# Patient Record
Sex: MALE | Race: WHITE | NOT HISPANIC OR LATINO | URBAN - METROPOLITAN AREA
[De-identification: names, ages, dates, MRNs, and addresses within clinical notes are randomized per-mention and may not be internally consistent; named-entity substitution may affect disease eponyms.]

---

## 2020-05-30 ENCOUNTER — HOSPITAL ENCOUNTER (EMERGENCY)
Facility: HOSPITAL | Age: 50
Discharge: HOME | End: 2020-05-31
Attending: INTERNAL MEDICINE
Payer: COMMERCIAL

## 2020-05-30 VITALS
OXYGEN SATURATION: 97 % | SYSTOLIC BLOOD PRESSURE: 152 MMHG | HEIGHT: 71 IN | DIASTOLIC BLOOD PRESSURE: 91 MMHG | HEART RATE: 87 BPM | TEMPERATURE: 99 F | BODY MASS INDEX: 28.7 KG/M2 | RESPIRATION RATE: 18 BRPM | WEIGHT: 205 LBS

## 2020-05-30 DIAGNOSIS — S61.412A LACERATION OF LEFT HAND WITHOUT FOREIGN BODY, INITIAL ENCOUNTER: Primary | ICD-10-CM

## 2020-05-30 DIAGNOSIS — T14.90XA TRAUMA: ICD-10-CM

## 2020-05-30 PROCEDURE — 73130 XR HAND COMPLETE 3 VIEW LEFT: ICD-10-PCS | Mod: 26,LT,, | Performed by: RADIOLOGY

## 2020-05-30 PROCEDURE — 73130 X-RAY EXAM OF HAND: CPT | Mod: TC,FY,LT

## 2020-05-30 PROCEDURE — 73130 X-RAY EXAM OF HAND: CPT | Mod: 26,LT,, | Performed by: RADIOLOGY

## 2020-05-30 PROCEDURE — 12042 INTMD RPR N-HF/GENIT2.6-7.5: CPT | Mod: ,,, | Performed by: INTERNAL MEDICINE

## 2020-05-30 PROCEDURE — 12042 INTMD RPR N-HF/GENIT2.6-7.5: CPT | Mod: FA

## 2020-05-30 PROCEDURE — 12042: ICD-10-PCS | Mod: ,,, | Performed by: INTERNAL MEDICINE

## 2020-05-30 PROCEDURE — 90471 IMMUNIZATION ADMIN: CPT | Performed by: INTERNAL MEDICINE

## 2020-05-30 PROCEDURE — 96372 THER/PROPH/DIAG INJ SC/IM: CPT | Mod: 59

## 2020-05-30 PROCEDURE — 99284 EMERGENCY DEPT VISIT MOD MDM: CPT | Mod: 25

## 2020-05-30 PROCEDURE — 63600175 PHARM REV CODE 636 W HCPCS: Performed by: INTERNAL MEDICINE

## 2020-05-30 PROCEDURE — 90714 TD VACC NO PRESV 7 YRS+ IM: CPT | Performed by: INTERNAL MEDICINE

## 2020-05-30 RX ORDER — LIDOCAINE HYDROCHLORIDE 10 MG/ML
INJECTION INFILTRATION; PERINEURAL
Status: DISCONTINUED
Start: 2020-05-30 | End: 2020-05-31 | Stop reason: HOSPADM

## 2020-05-30 RX ORDER — CEFTRIAXONE 1 G/1
1 INJECTION, POWDER, FOR SOLUTION INTRAMUSCULAR; INTRAVENOUS
Status: COMPLETED | OUTPATIENT
Start: 2020-05-30 | End: 2020-05-30

## 2020-05-30 RX ADMIN — TETANUS AND DIPHTHERIA TOXOIDS ADSORBED 0.5 ML: 2; 2 INJECTION INTRAMUSCULAR at 11:05

## 2020-05-30 RX ADMIN — CEFTRIAXONE SODIUM 1 G: 1 INJECTION, POWDER, FOR SOLUTION INTRAMUSCULAR; INTRAVENOUS at 11:05

## 2020-05-31 RX ORDER — ACETAMINOPHEN AND CODEINE PHOSPHATE 300; 30 MG/1; MG/1
1 TABLET ORAL EVERY 6 HOURS PRN
Qty: 12 TABLET | Refills: 0 | Status: SHIPPED | OUTPATIENT
Start: 2020-05-31 | End: 2020-06-10

## 2020-05-31 RX ORDER — AMOXICILLIN AND CLAVULANATE POTASSIUM 875; 125 MG/1; MG/1
1 TABLET, FILM COATED ORAL 2 TIMES DAILY
Qty: 20 TABLET | Refills: 0 | Status: SHIPPED | OUTPATIENT
Start: 2020-05-31

## 2020-05-31 NOTE — ED PROVIDER NOTES
Encounter Date: 5/30/2020       History     Chief Complaint   Patient presents with    Laceration     left thumb was struck by a hi speed grinding wheel  bleeding controlled     Patient is a 49-year-old male that presented with complaints laceration to the left thumb/hand area.  Patient was using a  and the grinding wheel broke apart sending shrapnel from the grounding will into his left hand.  The laceration is on the palmar surface in the thenar area at the MP joint of the thumb.  Laceration approximately 3 cm with jagged edges.  Patient is the usually irrigating now and will get x-ray to ensure no foreign body.  Patient denies any other past medical history.  Patient has no known drug allergies.        Review of patient's allergies indicates:  No Known Allergies  History reviewed. No pertinent past medical history.  History reviewed. No pertinent surgical history.  History reviewed. No pertinent family history.  Social History     Tobacco Use    Smoking status: Former Smoker   Substance Use Topics    Alcohol use: Not Currently    Drug use: Not Currently     Review of Systems   Constitutional: Negative for activity change, appetite change, fatigue and fever.   HENT: Negative for congestion, ear discharge, mouth sores, nosebleeds, rhinorrhea, sinus pressure, sinus pain and tinnitus.    Eyes: Negative.  Negative for pain, redness and itching.   Respiratory: Negative for apnea, cough, choking, chest tightness, shortness of breath, wheezing and stridor.    Cardiovascular: Negative for chest pain, palpitations and leg swelling.   Gastrointestinal: Negative for abdominal distention, abdominal pain, anal bleeding, blood in stool, constipation and diarrhea.   Endocrine: Negative.    Genitourinary: Negative for difficulty urinating, flank pain, frequency and urgency.   Musculoskeletal: Negative for arthralgias, back pain, gait problem and myalgias.   Skin: Positive for wound. Negative for color change and  pallor.        Patient has 3 cm wound of the left palmar surface at the MP joint of the 1st digit.  Sees jagged in nature and bleeding is well controlled.   Allergic/Immunologic: Negative.    Neurological: Negative for dizziness, facial asymmetry, weakness, light-headedness and headaches.   Hematological: Negative for adenopathy. Does not bruise/bleed easily.   Psychiatric/Behavioral: The patient is nervous/anxious.        Physical Exam     Initial Vitals [05/30/20 2244]   BP Pulse Resp Temp SpO2   (!) 152/91 87 18 98.5 °F (36.9 °C) 97 %      MAP       --         Physical Exam    Nursing note and vitals reviewed.  Constitutional: He appears well-developed and well-nourished.   HENT:   Head: Normocephalic and atraumatic.   Eyes: EOM are normal. Pupils are equal, round, and reactive to light.   Neck: Normal range of motion.   Cardiovascular: Normal rate and regular rhythm.   Pulmonary/Chest: Breath sounds normal.   Abdominal: Soft. Bowel sounds are normal.   Musculoskeletal: Normal range of motion.   Neurological: He is alert and oriented to person, place, and time. He has normal strength. GCS score is 15. GCS eye subscore is 4. GCS verbal subscore is 5. GCS motor subscore is 6.   Skin: Capillary refill takes less than 2 seconds.   Patient with 3 cm linear jagged wound on the left palmar surface at the MP joint 1st digit.  Bleeding is well controlled and vigorous irrigation has been completed.  Patient has opposition of all digits and no tendon involvement.   Psychiatric: He has a normal mood and affect.         ED Course   Lac Repair  Date/Time: 5/30/2020 11:57 PM  Performed by: Vinny Flores MD  Authorized by: Vinny Flores MD   Consent Done: Yes  Consent: Verbal consent obtained.  Risks and benefits: risks, benefits and alternatives were discussed  Consent given by: patient  Patient understanding: patient states understanding of the procedure being performed  Patient consent: the patient's understanding of  "the procedure matches consent given  Procedure consent: procedure consent matches procedure scheduled  Relevant documents: relevant documents present and verified  Test results: test results available and properly labeled  Site marked: the operative site was marked  Patient identity confirmed: , MRN, name, provided demographic data and verbally with patient  Time out: Immediately prior to procedure a "time out" was called to verify the correct patient, procedure, equipment, support staff and site/side marked as required.  Body area: upper extremity  Location details: left thumb  Laceration length: 3 cm  Contamination: The wound is contaminated.  Foreign bodies: metal  Tendon involvement: none  Nerve involvement: none  Vascular damage: no  Anesthesia: local infiltration    Anesthesia:  Local Anesthetic: lidocaine 1% without epinephrine  Preparation: Patient was prepped and draped in the usual sterile fashion.  Irrigation solution: saline  Irrigation method: syringe  Amount of cleaning: extensive  Debridement: none  Degree of undermining: none  Skin closure: 4-0 nylon  Number of sutures: 6  Technique: simple  Approximation: close  Approximation difficulty: simple  Dressing: dressing applied  Patient tolerance: Patient tolerated the procedure well with no immediate complications        Labs Reviewed - No data to display       Imaging Results          X-Ray Hand 3 view Left (In process)    Procedure changed from X-Ray Hand 2 View Left               X-Rays:   Independently Interpreted Readings:   Other Readings:  X-ray of the left hand reveals very small avulsion fracture of the proximal phalanx of the 1st digit left hand.  No dislocation the bone and otherwise no foreign bodies noted                                    Clinical Impression:       ICD-10-CM ICD-9-CM   1. Laceration of left hand without foreign body, initial encounter  S61.412A 882.0   2. Trauma  T14.90XA 959.9                             "

## 2024-01-03 ENCOUNTER — OFFICE VISIT (OUTPATIENT)
Dept: URGENT CARE | Facility: CLINIC | Age: 54
End: 2024-01-03
Payer: COMMERCIAL

## 2024-01-03 VITALS
HEIGHT: 71 IN | TEMPERATURE: 98 F | SYSTOLIC BLOOD PRESSURE: 138 MMHG | BODY MASS INDEX: 28 KG/M2 | DIASTOLIC BLOOD PRESSURE: 80 MMHG | HEART RATE: 73 BPM | OXYGEN SATURATION: 97 % | WEIGHT: 200 LBS | RESPIRATION RATE: 18 BRPM

## 2024-01-03 DIAGNOSIS — S05.01XA ABRASION OF RIGHT CORNEA, INITIAL ENCOUNTER: Primary | ICD-10-CM

## 2024-01-03 PROCEDURE — 99203 OFFICE O/P NEW LOW 30 MIN: CPT | Mod: S$GLB,,, | Performed by: NURSE PRACTITIONER

## 2024-01-03 RX ORDER — GENTAMICIN SULFATE 3 MG/ML
2 SOLUTION/ DROPS OPHTHALMIC EVERY 4 HOURS
Qty: 10 ML | Refills: 0 | Status: SHIPPED | OUTPATIENT
Start: 2024-01-03

## 2024-01-03 RX ORDER — KETOROLAC TROMETHAMINE 5 MG/ML
1 SOLUTION OPHTHALMIC 4 TIMES DAILY
Qty: 10 ML | Refills: 0 | Status: SHIPPED | OUTPATIENT
Start: 2024-01-03 | End: 2024-01-03

## 2024-01-03 RX ORDER — KETOROLAC TROMETHAMINE 5 MG/ML
1 SOLUTION OPHTHALMIC 4 TIMES DAILY
Qty: 10 ML | Refills: 0 | Status: SHIPPED | OUTPATIENT
Start: 2024-01-03 | End: 2024-01-13

## 2024-01-03 RX ORDER — GENTAMICIN SULFATE 3 MG/ML
2 SOLUTION/ DROPS OPHTHALMIC EVERY 4 HOURS
Qty: 10 ML | Refills: 0 | Status: SHIPPED | OUTPATIENT
Start: 2024-01-03 | End: 2024-01-03

## 2024-01-03 NOTE — PROGRESS NOTES
"Dictation #1  MRN:[id number]  [facility]:[id number] Subjective:       Patient ID: Nicolás Lainez is a 53 y.o. male.    Vitals:  height is 5' 11" (1.803 m) and weight is 90.7 kg (200 lb). His oral temperature is 98.4 °F (36.9 °C). His blood pressure is 138/80 and his pulse is 73. His respiration is 18 and oxygen saturation is 97%.     Chief Complaint: Eye Problem (Yesterday evening about 5:00 pm he got what he believes is wood in his right eye.  C/o pain in the eye.)    This is a 53 y.o. male who presents today with a chief complaint of Yesterday evening about 5:00 pm he got what he believes is wood in his right eye.  C/o pain in the eye and feeling something moving in his eye.  Patient presents with:  Eye Problem: Yesterday evening about 5:00 pm he got what he believes is wood in his right eye.  C/o pain in the eye.         Eye Problem   The right eye is affected. This is a new problem. The current episode started yesterday. The problem has been unchanged. The injury mechanism was a foreign body. The pain is at a severity of 8/10. The pain is moderate. He Does not wear contacts. He has tried water for the symptoms. The treatment provided no relief.     ROS        Objective:      Physical Exam   Constitutional: He is oriented to person, place, and time. He appears well-developed.   HENT:   Head: Normocephalic and atraumatic.   Ears:   Right Ear: External ear normal.   Left Ear: External ear normal.   Nose: Rhinorrhea and congestion present.   Mouth/Throat: Mucous membranes are normal. Posterior oropharyngeal erythema present.   Eyes: Conjunctivae and lids are normal.   Neck: Trachea normal. Neck supple.   Cardiovascular: Normal rate, regular rhythm and normal heart sounds.   Pulmonary/Chest: Effort normal and breath sounds normal. No respiratory distress.   Abdominal: Normal appearance and bowel sounds are normal. He exhibits no distension and no mass. Soft. There is no abdominal tenderness.   Musculoskeletal: Normal range of " motion.         General: Normal range of motion.   Neurological: He is alert and oriented to person, place, and time. He has normal strength.   Skin: Skin is warm, dry, intact, not diaphoretic and not pale.   Psychiatric: His speech is normal and behavior is normal. Judgment and thought content normal.   Nursing note and vitals reviewed.        Past medical history and current medications reviewed.     Limited abilities here with eye exam evaluation, pt was aware on check in, needs opthalmology as discussed in room and before check in.     Pt states he feels better after eye being flushed, I did see a couple particles with limited ability removed with q tip and flushing the right eye with eye irrigation solution and syringe.   Sates he does not want a tetanus shot and doesn't need one, .   Assessment:           1. Abrasion of right cornea, initial encounter              Plan:         Abrasion of right cornea, initial encounter  -     gentamicin (GARAMYCIN) 0.3 % ophthalmic solution; Place 2 drops into both eyes every 4 (four) hours.  Dispense: 10 mL; Refill: 0  -     ketorolac 0.5% (ACULAR) 0.5 % Drop; Place 1 drop into the right eye 4 (four) times daily. for 10 days  Dispense: 10 mL; Refill: 0  -     Ambulatory referral/consult to Ophthalmology             Patient Instructions     You must understand that you've received an Urgent Care treatment only and that you may be released before all your medical problems are known or treated. You, the patient, will arrange for follow up care as instructed.  Follow up with your PCP or specialty clinic as directed in the next 1-2 weeks if not improved or as needed.  You can call [phone] to schedule an appointment with the appropriate provider.  If your condition worsens we recommend that you receive another evaluation at the emergency room immediately or contact your primary medical clinics after hours call service to discuss your concerns.  Please return here or go to  the Emergency Department for any concerns or worsening of condition.  Please if you smoke please consider quitting. [facility] Smoke cessation hotline number is [phone], available at this number is free counseling and medications to live a healthier life!       If you were prescribed a narcotic or controlled medication, do not drive or operate heavy equipment or machinery while taking these medications.    If you were not prescribed an antibiotic and your not better please return for a recheck. Antibiotic therapy is not always indicated initially.   Please attempt over the counter medications, give it time and try Echinacea, Zinc and Vitamin C to fight common colds and virus.

## 2024-01-03 NOTE — PATIENT INSTRUCTIONS
You must understand that you've received an Urgent Care treatment only and that you may be released before all your medical problems are known or treated. You, the patient, will arrange for follow up care as instructed.  Follow up with your PCP or specialty clinic as directed in the next 1-2 weeks if not improved or as needed.  You can call [phone] to schedule an appointment with the appropriate provider.  If your condition worsens we recommend that you receive another evaluation at the emergency room immediately or contact your primary medical clinics after hours call service to discuss your concerns.  Please return here or go to the Emergency Department for any concerns or worsening of condition.  Please if you smoke please consider quitting. [facility] Smoke cessation hotline number is [phone], available at this number is free counseling and medications to live a healthier life!       If you were prescribed a narcotic or controlled medication, do not drive or operate heavy equipment or machinery while taking these medications.    If you were not prescribed an antibiotic and your not better please return for a recheck. Antibiotic therapy is not always indicated initially.   Please attempt over the counter medications, give it time and try Echinacea, Zinc and Vitamin C to fight common colds and virus.

## 2024-01-04 ENCOUNTER — TELEPHONE (OUTPATIENT)
Dept: URGENT CARE | Facility: CLINIC | Age: 54
End: 2024-01-04
Payer: COMMERCIAL

## 2024-01-04 NOTE — TELEPHONE ENCOUNTER
I attempted to call patient but pt did not answer. ML        ----- Message from Daksha Brantley sent at 1/3/2024  7:04 PM CST -----  Contact: [phone]  Patient is calling about a prescription.

## 2024-06-25 ENCOUNTER — OFFICE VISIT (OUTPATIENT)
Dept: URGENT CARE | Facility: CLINIC | Age: 54
End: 2024-06-25
Payer: COMMERCIAL

## 2024-06-25 VITALS
RESPIRATION RATE: 16 BRPM | DIASTOLIC BLOOD PRESSURE: 77 MMHG | SYSTOLIC BLOOD PRESSURE: 125 MMHG | BODY MASS INDEX: 28.18 KG/M2 | WEIGHT: 201.25 LBS | TEMPERATURE: 98 F | OXYGEN SATURATION: 97 % | HEART RATE: 93 BPM | HEIGHT: 71 IN

## 2024-06-25 DIAGNOSIS — S46.811A TRAPEZIUS STRAIN, RIGHT, INITIAL ENCOUNTER: Primary | ICD-10-CM

## 2024-06-25 PROCEDURE — 99214 OFFICE O/P EST MOD 30 MIN: CPT | Mod: S$GLB,,, | Performed by: NURSE PRACTITIONER

## 2024-06-25 RX ORDER — PREDNISONE 20 MG/1
20 TABLET ORAL DAILY
Qty: 5 TABLET | Refills: 0 | Status: SHIPPED | OUTPATIENT
Start: 2024-06-25 | End: 2024-06-30

## 2024-06-25 RX ORDER — CYCLOBENZAPRINE HCL 10 MG
10 TABLET ORAL EVERY 8 HOURS PRN
Qty: 30 TABLET | Refills: 0 | Status: SHIPPED | OUTPATIENT
Start: 2024-06-25 | End: 2024-07-05

## 2024-06-25 RX ORDER — CYCLOBENZAPRINE HCL 5 MG
5 TABLET ORAL 3 TIMES DAILY
COMMUNITY
Start: 2024-06-14

## 2024-06-25 RX ORDER — MELOXICAM 15 MG
15 TABLET ORAL
COMMUNITY
Start: 2024-06-14

## 2024-06-25 NOTE — PROGRESS NOTES
"Subjective:      Patient ID: Nicolás Lainez is a 53 y.o. male.    Vitals:  height is 5' 11" (1.803 m) and weight is 91.3 kg (201 lb 4.5 oz). His oral temperature is 97.8 °F (36.6 °C). His blood pressure is 125/77 and his pulse is 93. His respiration is 16 and oxygen saturation is 97%.     Chief Complaint: Shoulder Pain    54yo male with c/o right shoulder pain x 1 week. He explains that his pain started the morning following a day of doing yard work (raking and digging with shovel). He c/o posterior right shoulder pain around scapula that radiates to right cervical paraspinal muscle area.    Shoulder Pain   This is a new problem. The current episode started in the past 7 days. The problem has been unchanged.       Musculoskeletal:  Positive for pain.   Skin:  Negative for erythema.      Objective:     Physical Exam   Constitutional: He is oriented to person, place, and time. normal  HENT:   Head: Normocephalic and atraumatic.   Eyes: Conjunctivae are normal. Extraocular movement intact   Neck: Neck supple.      Comments: +spasm/TTP right cervical paraspinal muscle. No neck rigidity present.   Cardiovascular: Normal rate, regular rhythm, normal heart sounds and normal pulses.   Pulmonary/Chest: Effort normal and breath sounds normal.   Abdominal: Normal appearance.   Musculoskeletal: Normal range of motion.         General: Tenderness present. No swelling, deformity or signs of injury. Normal range of motion.      Comments: +spasm/TTP right trapezius muscle.   Lymphadenopathy:     He has no cervical adenopathy.   Neurological: He is alert and oriented to person, place, and time.   Skin: Skin is warm, dry and no rash. No bruising and No erythema   Psychiatric: His behavior is normal.   Vitals reviewed.    No results found for this or any previous visit. XR SHOULDER COMPLETE 2 OR MORE VIEWS RIGHT    Result Date: 6/25/2024  EXAMINATION: XR SHOULDER COMPLETE 2 OR MORE VIEWS RIGHT CLINICAL HISTORY: Pain in right shoulder " TECHNIQUE: Two or three views of the right shoulder were performed. COMPARISON: None FINDINGS: No acute fracture or dislocation.  No significant soft tissue swelling. Humeral head normally positioned with the glenoid cavity.  Glenohumeral joint space preserved. AC joint is intact. Visualized right lung is clear.     No acute radiographic findings of the right shoulder. Electronically signed by: Keanu Gomez Date:    06/25/2024 Time:    16:44       Assessment:     1. Trapezius strain, right, initial encounter        Plan:       Trapezius strain, right, initial encounter  -     XR SHOULDER COMPLETE 2 OR MORE VIEWS RIGHT; Future; Expected date: 06/25/2024  -     cyclobenzaprine (FLEXERIL) 10 MG tablet; Take 1 tablet (10 mg total) by mouth every 8 (eight) hours as needed for Muscle spasms.  Dispense: 30 tablet; Refill: 0  -     predniSONE (DELTASONE) 20 MG tablet; Take 1 tablet (20 mg total) by mouth once daily. for 5 days  Dispense: 5 tablet; Refill: 0    INSTRUCTIONS  Meds as prescribed. Follow up as advised.